# Patient Record
Sex: MALE | Race: WHITE
[De-identification: names, ages, dates, MRNs, and addresses within clinical notes are randomized per-mention and may not be internally consistent; named-entity substitution may affect disease eponyms.]

---

## 2023-07-22 ENCOUNTER — HOSPITAL ENCOUNTER (EMERGENCY)
Dept: HOSPITAL 76 - ED | Age: 23
Discharge: HOME | End: 2023-07-22
Payer: COMMERCIAL

## 2023-07-22 VITALS — SYSTOLIC BLOOD PRESSURE: 130 MMHG | DIASTOLIC BLOOD PRESSURE: 73 MMHG
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
## 2023-07-22 DIAGNOSIS — S61.411A: Primary | ICD-10-CM

## 2023-07-22 PROCEDURE — 12001 RPR S/N/AX/GEN/TRNK 2.5CM/<: CPT

## 2023-07-22 PROCEDURE — 99281 EMR DPT VST MAYX REQ PHY/QHP: CPT

## 2023-07-22 NOTE — ED PHYSICIAN DOCUMENTATION
PD HPI UPPER EXT INJURY





- Stated complaint


Stated Complaint: RT HAND LAC





- Chief complaint


Chief Complaint: Laceration





- History obtained from


History obtained from: Patient, Family





- History of Present Illness


Location: Right, Hand


Type of injury: Laceration


Pain level max: 3


Pain level now: 2


Improved by: Rest


Worsened by: Moving, Palpating





- Additonal information


Additional information: 





22-year-old male presents to the emergency department with a laceration to the 

dorsum of the right hand.  This was while working on a jeep today.  Worse with 

movement, better with rest.  Not on blood thinners.  Patient is right-handed.  

No fevers.  No chills.  Tetanus up-to-date.





PD PAST MEDICAL HISTORY





- Past Medical History


Past Medical History: No





- Past Surgical History


Past Surgical History: Yes


Ortho: Shoulder arthroplasty





- Present Medications


Home Medications: 


                                Ambulatory Orders











 Medication  Instructions  Recorded  Confirmed


 


No Known Home Medications  07/22/23 07/22/23














- Allergies


Allergies/Adverse Reactions: 


                                    Allergies











Allergy/AdvReac Type Severity Reaction Status Date / Time


 


No Known Drug Allergies Allergy   Verified 07/22/23 15:08














- Social History


Does the pt smoke?: No


Smoking Status: Never smoker





- Immunizations


Immunizations are current?: Yes


Immunizations: TDAP current <10years





PD ED PE NORMAL





- Vitals


Vital signs reviewed: Yes





- General


General: Alert and oriented X 3, No acute distress





- Derm


Derm: Warm and dry





- Extremities


Extremities: Other (2 cm laceration of the dorsum of the right hand.  

Neurovascular intact.  This is over the MCP joint of the index finger.  

Subcutaneous. No deformity or bony tenderness Using the hand freely)





- Neuro


Neuro: Alert and oriented X 3





Results





- Vitals


Vitals: 


                               Vital Signs - 24 hr











  07/22/23





  15:05


 


Temperature 37.3 C


 


Heart Rate 71


 


Respiratory 14





Rate 


 


Blood Pressure 130/73


 


O2 Saturation 99








                                     Oxygen











O2 Source                      Room air

















Procedures





- Laceration (location)


  ** Right hand


Length in cm: 2


Wound type: Linear, Into subcut fat, Clean


Neurovascular status: Sensory intact, Motor intact, Vascular intact


Tendon involvement: Tendon intact


Anesthesia: Lidocaine 1% with epi


Wound preparation: Irrigated copiously NS, Wound explored, To the base


Skin layer closure: Nylon, Interrupted, Size #[id number] - enter number (4)


Other: Patient tolerated well, No complications, Neurovascular intact, Dressing 

applied, Tetanus UTD





PD Medical Decision Making





- ED course


Complexity details: considered differential, d/w patient


ED course: 





Laceration of the dorsum of the right hand.  Repaired.  Tolerated well.  No 

complications.  No indication for emergent imaging.  No evidence of tendon 

injury or bony injury.  Warnings of infection and instructions on wound care 

given at bedside. Also counseled on how to minimize scarring.  Patient counseled

regarding signs and symptoms for which I believe and urgent re-evaluation would 

be necessary. Patient with good understanding of and agreement to plan and is 

comfortable going home at this time





This document was made in part using voice recognition software. While efforts 

are made to proofread this document, sound alike and grammatical errors may 

occur.





Departure





- Departure


Disposition: 01 Home, Self Care


Clinical Impression: 


Hand laceration


Qualifiers:


 Encounter type: initial encounter Foreign body presence: without foreign body 

Laterality: right Qualified Code(s): S61.411A - Laceration without foreign body 

of right hand, initial encounter





Condition: Good


Instructions:  ED Laceration Hand


Follow-Up: 


your,doctor in 2 weeks for suture removal [Other]


Comments: 


Keep the wound clean.  Return if you notice redness, swelling or drainage from 

the wound as these can be signs of infection.  Your sutures should be removed in

 about 10 to 14 days, this can be done with your doctor.


Forms:  PCP List


Discharge Date/Time: 07/22/23 16:01